# Patient Record
Sex: FEMALE | Race: WHITE | URBAN - METROPOLITAN AREA
[De-identification: names, ages, dates, MRNs, and addresses within clinical notes are randomized per-mention and may not be internally consistent; named-entity substitution may affect disease eponyms.]

---

## 2021-05-19 ENCOUNTER — OFFICE VISIT (OUTPATIENT)
Dept: DERMATOLOGY | Facility: CLINIC | Age: 46
End: 2021-05-19
Payer: COMMERCIAL

## 2021-05-19 DIAGNOSIS — Z80.8 FAMILY HISTORY OF MELANOMA: ICD-10-CM

## 2021-05-19 DIAGNOSIS — D23.9 DERMATOFIBROMA: Primary | ICD-10-CM

## 2021-05-19 DIAGNOSIS — L81.4 LENTIGINES: ICD-10-CM

## 2021-05-19 PROCEDURE — 99203 OFFICE O/P NEW LOW 30 MIN: CPT | Mod: GC | Performed by: DERMATOLOGY

## 2021-05-19 ASSESSMENT — PAIN SCALES - GENERAL: PAINLEVEL: NO PAIN (0)

## 2021-05-19 NOTE — NURSING NOTE
Chief Complaint   Patient presents with     Derm Problem     Sonya is here for her first full body skin exam. She has one area of concern on her left thigh.    Amira Zarco

## 2021-05-19 NOTE — LETTER
5/19/2021       RE: Sonya Lorenzo  [address]  [place] [address]     Dear Colleague,    Thank you for referring your patient, Sonya Lorenzo, to the [facility]ERMATOLOGY [facility] [place] at [facility]. Please see a copy of my visit note below.    [facility] Dermatology Note  Encounter Date: May 19, 2021  Office Visit     Dermatology Problem List:  1. Fhx melanoma (dad).  # Lesion to monitor:   - L thigh, photo 5/19/21  ____________________________________________    Assessment & Plan:     # Neoplasm of uncertain behavior, L thigh.  Favor dermatofibroma. History of stability over past few years reassuring. We do note history of melanoma in father. Discussed options of photomonitoring versus biopsy, we will plan for photomonitoring.  - Photo today  - She will let us know if changes    # Multiple benign nevi.   # Fhx melanoma (dad).  - No concerning lesions today  - Counseled on ABCDEs of melanoma and sun protection - advised SPF 30 or higher with frequent application   - Return sooner if noticing changing or symptomatic lesions    # Benign lesions including SKs, cherry angioma, solar lentigines . No treatment required     Procedures Performed:   None    Follow-up: 6 months to recheck left thigh, sooner if concerns.     Staff and Resident:     Atul Vaughn MD  [facility] Resident  Pager# [phone]    Precepted with Dr. Jeffers    Staff Physician Comments:   I saw and evaluated the patient with the resident and I agree with the assessment and plan.  I was present for the examination.    Kelle Jeffers MD    Department of Dermatology  [facility]: Phone: [phone], Fax: [phone]  5/27/2021     ____________________________________________    CC: Derm Problem (Sonya is here for her first full body skin exam. She has one  area of concern on her left thigh. )    HPI:   Ms. Sonya Lorenzo is a(n) 45 year old female who presents today as a new patient for spot check    Patient reports that she has had a spot on her left thigh that has been present for the past few years. Doesn't think that it is changing but will occasionally get a scale. Sometimes itchy.     Father was recently diagnosed with melanoma.     History of tanning bed use when she was young. Nothing recently. Uses sunscreen. No personal history of skin cancer.     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    Patient is otherwise feeling well, without additional skin concerns.    Labs Reviewed:  N/A    Physical Exam:  Vitals: There were no vitals taken for this visit.  GEN: This is a well developed, well-nourished female in no acute distress, in a pleasant mood.    SKIN: Total skin excluding the undergarment areas was performed. The exam included the head/face, neck, both arms, chest, back, abdomen, both legs, digits and/or nails.   -  5 mm pink slightly firm papule, dermoscopy with central scar like area, no pigment network  - There are dome shaped bright red papules on the chest.   Multiple regular brown pigmented macules and papules are identified on the trunk, upper and lower extremities.   Scattered brown macules on sun exposed areas..   - No other lesions of concern on areas examined.     Medications:  Current Outpatient Medications   Medication     norethin-eth estradiol-fe (GILDESS 24 FE) 1-20 MG-MCG(24) tablet     No current facility-administered medications for this visit.       Past Medical History:   There is no problem list on file for this patient.    No past medical history on file.    CC Referred Self, MD  No address on file on close of this encounter.

## 2021-05-19 NOTE — PROGRESS NOTES
[facility] Dermatology Note  Encounter Date: May 19, 2021  Office Visit     Dermatology Problem List:  1. Fhx melanoma (dad).  # Lesion to monitor:   - L thigh, photo 5/19/21  ____________________________________________    Assessment & Plan:     # Neoplasm of uncertain behavior, L thigh.  Favor dermatofibroma. History of stability over past few years reassuring. We do note history of melanoma in father. Discussed options of photomonitoring versus biopsy, we will plan for photomonitoring.  - Photo today  - She will let us know if changes    # Multiple benign nevi.   # Fhx melanoma (dad).  - No concerning lesions today  - Counseled on ABCDEs of melanoma and sun protection - advised SPF 30 or higher with frequent application   - Return sooner if noticing changing or symptomatic lesions    # Benign lesions including SKs, cherry angioma, solar lentigines . No treatment required     Procedures Performed:   None    Follow-up: 6 months to recheck left thigh, sooner if concerns.     Staff and Resident:     Atul Vaughn MD  [facility] Resident  Pager# [phone]    Precepted with Dr. Jeffers    Staff Physician Comments:   I saw and evaluated the patient with the resident and I agree with the assessment and plan.  I was present for the examination.    Kelle Jeffers MD    Department of Dermatology  [facility]: Phone: [phone], Fax: [phone]  5/27/2021     ____________________________________________    CC: Derm Problem (Sonya is here for her first full body skin exam. She has one area of concern on her left thigh. )    HPI:   Ms. Sonya Lorenzo is a(n) 45 year old female who presents today as a new patient for spot check    Patient reports that she has had a spot on her left thigh that has been present for the past few years. Doesn't think that it is changing but will occasionally get a  scale. Sometimes itchy.     Father was recently diagnosed with melanoma.     History of tanning bed use when she was young. Nothing recently. Uses sunscreen. No personal history of skin cancer.     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    Patient is otherwise feeling well, without additional skin concerns.    Labs Reviewed:  N/A    Physical Exam:  Vitals: There were no vitals taken for this visit.  GEN: This is a well developed, well-nourished female in no acute distress, in a pleasant mood.    SKIN: Total skin excluding the undergarment areas was performed. The exam included the head/face, neck, both arms, chest, back, abdomen, both legs, digits and/or nails.   -  5 mm pink slightly firm papule, dermoscopy with central scar like area, no pigment network  - There are dome shaped bright red papules on the chest.   Multiple regular brown pigmented macules and papules are identified on the trunk, upper and lower extremities.   Scattered brown macules on sun exposed areas..   - No other lesions of concern on areas examined.     Medications:  Current Outpatient Medications   Medication     norethin-eth estradiol-fe (GILDESS 24 FE) 1-20 MG-MCG(24) tablet     No current facility-administered medications for this visit.       Past Medical History:   There is no problem list on file for this patient.    No past medical history on file.    CC Referred Self, MD  No address on file on close of this encounter.

## 2021-05-27 NOTE — PATIENT INSTRUCTIONS
Patient Education     Checking for Skin Cancer  You can find cancer early by checking your skin each month. There are 3 kinds of skin cancer. They are melanoma, basal cell carcinoma, and squamous cell carcinoma. Doing monthly skin checks is the best way to find new marks or skin changes. Follow the instructions below for checking your skin.   The ABCDEs of checking moles for melanoma   Check your moles or growths for signs of melanoma using ABCDE:     Asymmetry: the sides of the mole or growth don t match    Border: the edges are ragged, notched, or blurred    Color: the color within the mole or growth varies    Diameter: the mole or growth is larger than 6 mm (size of a pencil eraser)    Evolving: the size, shape, or color of the mole or growth is changing (evolving is not shown in the images below)    Checking for other types of skin cancer  Basal cell carcinoma or squamous cell carcinoma have symptoms such as:       A spot or mole that looks different from all other marks on your skin    Changes in how an area feels, such as itching, tenderness, or pain    Changes in the skin's surface, such as oozing, bleeding, or scaliness    A sore that does not heal    New swelling or redness beyond the border of a mole    Who s at risk?  Anyone can get skin cancer. But you are at greater risk if you have:     Fair skin, light-colored hair, or light-colored eyes    Many moles or abnormal moles on your skin    A history of sunburns from sunlight or tanning beds    A family history of skin cancer    A history of exposure to radiation or chemicals    A weakened immune system  If you have had skin cancer in the past, you are at risk for recurring skin cancer.   How to check your skin  Do your monthly skin checkups in front of a full-length mirror. Check all parts of your body, including your:     Head (ears, face, neck, and scalp)    Torso (front, back, and sides)    Arms (tops, undersides, upper, and lower armpits)    Hands  (palms, backs, and fingers, including under the nails)    Buttocks and genitals    Legs (front, back, and sides)    Feet (tops, soles, toes, including under the nails, and between toes)  If you have a lot of moles, take digital photos of them each month. Make sure to take photos both up close and from a distance. These can help you see if any moles change over time.   Most skin changes are not cancer. But if you see any changes in your skin, call your doctor right away. Only he or she can diagnose a problem. If you have skin cancer, seeing your doctor can be the first step toward getting the treatment that could save your life.   [employer] last reviewed this educational content on 4/1/2019 2000-2020 [employer]. [address] [place] [address]. All rights reserved. This information is not intended as a substitute for professional medical care. Always follow your healthcare professional's instructions.       When should I call my doctor?    If you are worsening or not improving, please, contact us or seek urgent care as noted below.     Who should I call with questions (adults)?    [facility] (adult and pediatric): [phone]     [facility] (adult): [phone]    For urgent needs outside of business hours call the [facility] at [phone] and ask for the dermatology resident on call    If this is a medical emergency and you are unable to reach an ER, Call [phone]      Who should I call with questions (pediatric)?  [facility]- Pediatric Dermatology  Dr. Sandra Mathew, Dr. Miguel Rico, Dr. Herlinda Melo, Crystal Johnson, PA  Dr. Lynette Rubio, Dr. Kristina Jones & Dr. Kevin Galo  Non Urgent  Nurse Triage Line; [phone]- Alivia and Patience PATRICK Care Coordinatormarcello Oneal (/Complex ) [phone]    If you need a prescription refill, please contact your  pharmacy. Refills are approved or denied by our Physicians during normal business hours, Monday through Fridays  Per office policy, refills will not be granted if you have not been seen within the past year (or sooner depending on your child's condition)    Scheduling Information:  Pediatric Appointment Scheduling and Call Center [phone]  Radiology Scheduling- [phone]  Sedation Unit Scheduling- [phone]  [place] Scheduling- [facility] [phone]; Pediatric Dermatology [phone]  Main  Services: [phone]  Austrian: [phone]  Lao: [phone]  Hmong/Montenegrin/Balwinder: [phone]  Preadmission Nursing Department Fax Number: [phone] (Fax all pre-operative paperwork to this number)    For urgent matters arising during evenings, weekends, or holidays that cannot wait for normal business hours please call [phone] and ask for the Dermatology Resident On-Call to be paged.

## 2021-06-06 ENCOUNTER — HEALTH MAINTENANCE LETTER (OUTPATIENT)
Age: 46
End: 2021-06-06

## 2021-09-26 ENCOUNTER — HEALTH MAINTENANCE LETTER (OUTPATIENT)
Age: 46
End: 2021-09-26

## 2021-11-19 NOTE — PROGRESS NOTES
[facility] Dermatology Note  Encounter Date: Nov 23, 2021  Office Visit     Dermatology Problem List:  # Fhx melanoma (dad).  # Lesions to monitor:   - L thigh, photo 5/19/21 - unchanged from prior on exam 11/23/2021  - L lateral breast, photo 11/23/21  ____________________________________________    Assessment & Plan:    # Lesions to monitor:  # Nevus, L lateral breast  Approximately 6 mm medium brown macule centrally with hypopigmentation. History of stability over the past ~10 years without change or growth reassuring. Discussed options of photomonitoring versus biopsy, we will plan for photomonitoring and recheck in 6 months.   - Photo today  - Patient will let us know if changes    # Dermatofibroma, L thigh, appears unchanged from 5/19/21.  Suspect dermatofibroma. Appears unchanged compared to photo from 6 months ago. Will continue to monitor for changes.  - Patient will continue to monitor and report if changes     Procedures Performed:   None      Follow-up: 6 month(s) in-person, or earlier for new or changing lesions    Staff and Scribe:     Scribe Disclosure:  I, Magaly Diaz, am serving as a scribe to document services personally performed by Kelle Jeffers MD based on data collection and the provider's statements to me.     Provider Disclosure:   The documentation recorded by the scribe accurately reflects the services I personally performed and the decisions made by me.    Kelle Jeffers MD    Department of Dermatology  [facility]: Phone: [phone], Fax: [phone]  11/23/2021     ____________________________________________    CC: Skin Check (states that she has no new spots of concern, recheck spot under left breast)    HPI:  Ms. Sonya Lorenzo is a(n) 46 year old female who presents today as a return patient for spot checks. The patient was last seen in dermatology by myself on  5/19/21 at which time skin exam was performed and images of an NUB on the left thigh, favoring dermatofibroma, were obtained for monitoring.     Today, the patient does not report any changes to the lesion on her left thigh since last visit. She also reports a spot of concern under her left breast she would like examined. She states she has had a mole there for around 10 years since being pregnant that appears a bit irregular, but has not recently changed or grown.     Patient is otherwise feeling well, without additional skin concerns.    Labs Reviewed:  N/A    Physical Exam:  Vitals: There were no vitals taken for this visit.  SKIN: Focused examination of the left leg and left breast was performed.  - Left lateral breast she has a 6 mm medium brown macule centrally with hypopigmentation, dermoscopy with fairly regular linear network, centrally lost, and medially with 2 whirls of pigment  -  Left thigh she has a 5 mm pink slightly firm papule, dermoscopy with central scar like area, no pigment network, stable from prior  - No other lesions of concern on areas examined.     Medications:  Current Outpatient Medications   Medication     norethin-eth estradiol-fe (GILDESS 24 FE) 1-20 MG-MCG(24) tablet     No current facility-administered medications for this visit.      Past Medical History:   There is no problem list on file for this patient.    No past medical history on file.     CC No referring provider defined for this encounter. on close of this encounter.

## 2021-11-23 ENCOUNTER — OFFICE VISIT (OUTPATIENT)
Dept: DERMATOLOGY | Facility: CLINIC | Age: 46
End: 2021-11-23
Payer: COMMERCIAL

## 2021-11-23 DIAGNOSIS — D23.9 DERMATOFIBROMA: ICD-10-CM

## 2021-11-23 DIAGNOSIS — D22.9 BENIGN NEVUS: Primary | ICD-10-CM

## 2021-11-23 PROCEDURE — 99213 OFFICE O/P EST LOW 20 MIN: CPT | Performed by: DERMATOLOGY

## 2021-11-23 ASSESSMENT — PAIN SCALES - GENERAL: PAINLEVEL: NO PAIN (0)

## 2021-11-23 NOTE — PATIENT INSTRUCTIONS
Patient Education     Checking for Skin Cancer  You can find cancer early by checking your skin each month. There are 3 kinds of skin cancer. They are melanoma, basal cell carcinoma, and squamous cell carcinoma. Doing monthly skin checks is the best way to find new marks or skin changes. Follow the instructions below for checking your skin.   The ABCDEs of checking moles for melanoma   Check your moles or growths for signs of melanoma using ABCDE:     Asymmetry: the sides of the mole or growth don t match    Border: the edges are ragged, notched, or blurred    Color: the color within the mole or growth varies    Diameter: the mole or growth is larger than 6 mm (size of a pencil eraser)    Evolving: the size, shape, or color of the mole or growth is changing (evolving is not shown in the images below)    Checking for other types of skin cancer  Basal cell carcinoma or squamous cell carcinoma have symptoms such as:       A spot or mole that looks different from all other marks on your skin    Changes in how an area feels, such as itching, tenderness, or pain    Changes in the skin's surface, such as oozing, bleeding, or scaliness    A sore that does not heal    New swelling or redness beyond the border of a mole    Who s at risk?  Anyone can get skin cancer. But you are at greater risk if you have:     Fair skin, light-colored hair, or light-colored eyes    Many moles or abnormal moles on your skin    A history of sunburns from sunlight or tanning beds    A family history of skin cancer    A history of exposure to radiation or chemicals    A weakened immune system  If you have had skin cancer in the past, you are at risk for recurring skin cancer.   How to check your skin  Do your monthly skin checkups in front of a full-length mirror. Check all parts of your body, including your:     Head (ears, face, neck, and scalp)    Torso (front, back, and sides)    Arms (tops, undersides, upper, and lower armpits)    Hands  (palms, backs, and fingers, including under the nails)    Buttocks and genitals    Legs (front, back, and sides)    Feet (tops, soles, toes, including under the nails, and between toes)  If you have a lot of moles, take digital photos of them each month. Make sure to take photos both up close and from a distance. These can help you see if any moles change over time.   Most skin changes are not cancer. But if you see any changes in your skin, call your doctor right away. Only he or she can diagnose a problem. If you have skin cancer, seeing your doctor can be the first step toward getting the treatment that could save your life.   [employer] last reviewed this educational content on 4/1/2019 2000-2020 [employer]. [address] [place] [address]. All rights reserved. This information is not intended as a substitute for professional medical care. Always follow your healthcare professional's instructions.       When should I call my doctor?    If you are worsening or not improving, please, contact us or seek urgent care as noted below.     Who should I call with questions (adults)?    [facility] (adult and pediatric): [phone]    [facility] (adult): [phone]    For urgent needs outside of business hours call the [facility] at [phone] and ask for the dermatology resident on call to be paged    If this is a medical emergency and you are unable to reach an ER, Call [phone]    Who should I call with questions (pediatric)?  [facility]- Pediatric Dermatology  Dr. Sandra Mathew, Dr. Miguel Rico, Dr. Herlinda Melo, RODOLFO Marte, Dr. Lynette Rubio, Dr. Kristina Jones & Dr. Kevin Galo  Non-urgent nurse triage line; [phone]- Alivia and Patience PATRICK Care Coordinatormarcello Oneal (/Complex ) [phone]    If you need a prescription refill, please contact your  pharmacy. Refills are approved or denied by our Physicians during normal business hours, Monday through Fridays  Per office policy, refills will not be granted if you have not been seen within the past year (or sooner depending on your child's condition)    Scheduling Information:  Pediatric Appointment Scheduling and Call Center [phone]  Radiology Scheduling- [phone]  Sedation Unit Scheduling- [phone]  [place] Scheduling- [facility] [phone]; Pediatric Dermatology [phone]  Main  Services: [phone]  Tuvaluan: [phone]  Citizen of [place]: [phone]  Hmong/Welsh/Balwinder: [phone]  Preadmission Nursing Department Fax Number: [phone] (Fax all pre-operative paperwork to this number)    For urgent matters arising during evenings, weekends, or holidays that cannot wait for normal business hours please call [phone] and ask for the dermatology resident on call to be paged.

## 2021-11-23 NOTE — LETTER
11/23/2021       RE: Sonya Lorenzo  [address]  [place] [address]     Dear Colleague,    Thank you for referring your patient, Sonya Lorenzo, to the [facility] DERMATOLOGY [facility]INIC [place] at [facility]. Please see a copy of my visit note below.    [facility] Dermatology Note  Encounter Date: Nov 23, 2021  Office Visit     Dermatology Problem List:  # Fhx melanoma (dad).  # Lesions to monitor:   - L thigh, photo 5/19/21 - unchanged from prior on exam 11/23/2021  - L lateral breast, photo 11/23/21  ____________________________________________    Assessment & Plan:    # Lesions to monitor:  # Nevus, L lateral breast  Approximately 6 mm medium brown macule centrally with hypopigmentation. History of stability over the past ~10 years without change or growth reassuring. Discussed options of photomonitoring versus biopsy, we will plan for photomonitoring and recheck in 6 months.   - Photo today  - Patient will let us know if changes    # Dermatofibroma, L thigh, appears unchanged from 5/19/21.  Suspect dermatofibroma. Appears unchanged compared to photo from 6 months ago. Will continue to monitor for changes.  - Patient will continue to monitor and report if changes     Procedures Performed:   None      Follow-up: 6 month(s) in-person, or earlier for new or changing lesions    Staff and Scribe:     Scribe Disclosure:  I, Magaly Diaz, am serving as a scribe to document services personally performed by Kelle Jeffers MD based on data collection and the provider's statements to me.     Provider Disclosure:   The documentation recorded by the scribe accurately reflects the services I personally performed and the decisions made by me.    Kelle Jeffers MD    Department of Dermatology  [facility]: Phone: [phone], Fax:  [phone]  11/23/2021     ____________________________________________    CC: Skin Check (states that she has no new spots of concern, recheck spot under left breast)    HPI:  Ms. Sonya Lorenzo is a(n) 46 year old female who presents today as a return patient for spot checks. The patient was last seen in dermatology by myself on 5/19/21 at which time skin exam was performed and images of an NUB on the left thigh, favoring dermatofibroma, were obtained for monitoring.     Today, the patient does not report any changes to the lesion on her left thigh since last visit. She also reports a spot of concern under her left breast she would like examined. She states she has had a mole there for around 10 years since being pregnant that appears a bit irregular, but has not recently changed or grown.     Patient is otherwise feeling well, without additional skin concerns.    Labs Reviewed:  N/A    Physical Exam:  Vitals: There were no vitals taken for this visit.  SKIN: Focused examination of the left leg and left breast was performed.  - Left lateral breast she has a 6 mm medium brown macule centrally with hypopigmentation, dermoscopy with fairly regular linear network, centrally lost, and medially with 2 whirls of pigment  -  Left thigh she has a 5 mm pink slightly firm papule, dermoscopy with central scar like area, no pigment network, stable from prior  - No other lesions of concern on areas examined.     Medications:  Current Outpatient Medications   Medication     norethin-eth estradiol-fe (GILDESS 24 FE) 1-20 MG-MCG(24) tablet     No current facility-administered medications for this visit.      Past Medical History:   There is no problem list on file for this patient.    No past medical history on file.     CC No referring provider defined for this encounter. on close of this encounter.

## 2021-11-23 NOTE — NURSING NOTE
Dermatology Rooming Note    Sonya Lorenzo's goals for this visit include:   Chief Complaint   Patient presents with     Skin Check     states that she has no new spots of concern, recheck spot under left breast     Almita Villatoro CMA on 11/23/2021 at 7:46 AM

## 2022-07-03 ENCOUNTER — HEALTH MAINTENANCE LETTER (OUTPATIENT)
Age: 47
End: 2022-07-03

## 2023-01-08 ENCOUNTER — HEALTH MAINTENANCE LETTER (OUTPATIENT)
Age: 48
End: 2023-01-08

## 2023-07-16 ENCOUNTER — HEALTH MAINTENANCE LETTER (OUTPATIENT)
Age: 48
End: 2023-07-16

## 2023-10-25 ENCOUNTER — LAB REQUISITION (OUTPATIENT)
Dept: LAB | Facility: CLINIC | Age: 48
End: 2023-10-25
Payer: COMMERCIAL

## 2023-10-25 DIAGNOSIS — Z13.1 ENCOUNTER FOR SCREENING FOR DIABETES MELLITUS: ICD-10-CM

## 2023-10-25 DIAGNOSIS — Z13.220 ENCOUNTER FOR SCREENING FOR LIPOID DISORDERS: ICD-10-CM

## 2023-10-25 PROCEDURE — 83036 HEMOGLOBIN GLYCOSYLATED A1C: CPT | Mod: ORL | Performed by: OBSTETRICS & GYNECOLOGY

## 2023-10-25 PROCEDURE — 80061 LIPID PANEL: CPT | Mod: ORL | Performed by: OBSTETRICS & GYNECOLOGY

## 2023-10-26 LAB
CHOLEST SERPL-MCNC: 195 MG/DL
HBA1C MFR BLD: 5.2 %
HDLC SERPL-MCNC: 81 MG/DL
LDLC SERPL CALC-MCNC: 107 MG/DL
NONHDLC SERPL-MCNC: 114 MG/DL
TRIGL SERPL-MCNC: 35 MG/DL

## 2024-09-07 ENCOUNTER — HEALTH MAINTENANCE LETTER (OUTPATIENT)
Age: 49
End: 2024-09-07

## 2024-12-16 ENCOUNTER — LAB REQUISITION (OUTPATIENT)
Dept: LAB | Facility: CLINIC | Age: 49
End: 2024-12-16

## 2024-12-16 DIAGNOSIS — Z12.4 ENCOUNTER FOR SCREENING FOR MALIGNANT NEOPLASM OF CERVIX: ICD-10-CM

## 2024-12-16 PROCEDURE — G0145 SCR C/V CYTO,THINLAYER,RESCR: HCPCS | Performed by: OBSTETRICS & GYNECOLOGY

## 2024-12-19 LAB
BKR LAB AP GYN ADEQUACY: NORMAL
BKR LAB AP GYN INTERPRETATION: NORMAL
BKR LAB AP HPV REFLEX: NORMAL
BKR LAB AP LMP: NORMAL
BKR LAB AP PREVIOUS ABNL DX: NORMAL
BKR LAB AP PREVIOUS ABNORMAL: NORMAL
PATH REPORT.COMMENTS IMP SPEC: NORMAL
PATH REPORT.COMMENTS IMP SPEC: NORMAL
PATH REPORT.RELEVANT HX SPEC: NORMAL